# Patient Record
Sex: MALE | Race: WHITE | Employment: OTHER | URBAN - METROPOLITAN AREA
[De-identification: names, ages, dates, MRNs, and addresses within clinical notes are randomized per-mention and may not be internally consistent; named-entity substitution may affect disease eponyms.]

---

## 2017-01-01 ENCOUNTER — ANTI-COAG VISIT (OUTPATIENT)
Dept: PHARMACY | Age: 82
End: 2017-01-01

## 2017-01-01 ENCOUNTER — TELEPHONE (OUTPATIENT)
Dept: CARDIOLOGY CLINIC | Age: 82
End: 2017-01-01

## 2017-01-01 ENCOUNTER — OFFICE VISIT (OUTPATIENT)
Dept: CARDIOLOGY CLINIC | Age: 82
End: 2017-01-01

## 2017-01-01 ENCOUNTER — ANTI-COAG VISIT (OUTPATIENT)
Dept: CARDIOLOGY CLINIC | Age: 82
End: 2017-01-01

## 2017-01-01 ENCOUNTER — TELEPHONE (OUTPATIENT)
Dept: PHARMACY | Age: 82
End: 2017-01-01

## 2017-01-01 ENCOUNTER — HOSPITAL ENCOUNTER (OUTPATIENT)
Dept: OTHER | Age: 82
Discharge: HOME | End: 2017-03-31
Attending: INTERNAL MEDICINE | Admitting: INTERNAL MEDICINE

## 2017-01-01 VITALS
WEIGHT: 194 LBS | DIASTOLIC BLOOD PRESSURE: 78 MMHG | HEIGHT: 67 IN | BODY MASS INDEX: 30.45 KG/M2 | HEART RATE: 68 BPM | SYSTOLIC BLOOD PRESSURE: 140 MMHG | OXYGEN SATURATION: 94 %

## 2017-01-01 VITALS
SYSTOLIC BLOOD PRESSURE: 120 MMHG | HEIGHT: 67 IN | DIASTOLIC BLOOD PRESSURE: 70 MMHG | WEIGHT: 174 LBS | BODY MASS INDEX: 27.31 KG/M2 | HEART RATE: 64 BPM

## 2017-01-01 VITALS
WEIGHT: 185 LBS | SYSTOLIC BLOOD PRESSURE: 116 MMHG | DIASTOLIC BLOOD PRESSURE: 60 MMHG | HEIGHT: 67 IN | HEART RATE: 70 BPM | BODY MASS INDEX: 29.03 KG/M2

## 2017-01-01 VITALS
SYSTOLIC BLOOD PRESSURE: 132 MMHG | HEART RATE: 71 BPM | BODY MASS INDEX: 31.55 KG/M2 | DIASTOLIC BLOOD PRESSURE: 58 MMHG | WEIGHT: 201 LBS | HEIGHT: 67 IN

## 2017-01-01 DIAGNOSIS — I48.20 CHRONIC ATRIAL FIBRILLATION (HCC): ICD-10-CM

## 2017-01-01 DIAGNOSIS — I35.0 AORTIC STENOSIS, SEVERE: ICD-10-CM

## 2017-01-01 DIAGNOSIS — I48.20 CHRONIC ATRIAL FIBRILLATION (HCC): Primary | ICD-10-CM

## 2017-01-01 DIAGNOSIS — I10 ESSENTIAL HYPERTENSION, BENIGN: ICD-10-CM

## 2017-01-01 DIAGNOSIS — I34.0 NON-RHEUMATIC MITRAL REGURGITATION: ICD-10-CM

## 2017-01-01 DIAGNOSIS — I25.10 CORONARY ARTERY DISEASE INVOLVING NATIVE HEART WITHOUT ANGINA PECTORIS, UNSPECIFIED VESSEL OR LESION TYPE: Primary | ICD-10-CM

## 2017-01-01 DIAGNOSIS — I48.0 PAROXYSMAL ATRIAL FIBRILLATION (HCC): ICD-10-CM

## 2017-01-01 DIAGNOSIS — I25.10 CORONARY ARTERY DISEASE INVOLVING NATIVE HEART WITHOUT ANGINA PECTORIS, UNSPECIFIED VESSEL OR LESION TYPE: ICD-10-CM

## 2017-01-01 DIAGNOSIS — R06.02 SHORTNESS OF BREATH: ICD-10-CM

## 2017-01-01 LAB
INR BLD: 1.5
INR BLD: 1.5
INR BLD: 1.7
INR BLD: 1.7
INR BLD: 2
INR BLD: 2.3
INR BLD: 2.6
INR BLD: 2.7
INR BLD: 2.9
INR BLD: 3
INR BLD: 3.2
INR BLD: 3.7
INR BLD: 3.8
INR BLD: 3.9
PROTIME: 17.1 SECONDS
PROTIME: 24.5 SECONDS
PROTIME: 31.3 SECONDS
PROTIME: 32.5 SECONDS
PROTIME: 38.1 SECONDS
PROTIME: 44.4 SECONDS
PROTIME: 45.8 SECONDS

## 2017-01-01 PROCEDURE — G8598 ASA/ANTIPLAT THER USED: HCPCS | Performed by: NURSE PRACTITIONER

## 2017-01-01 PROCEDURE — 4040F PNEUMOC VAC/ADMIN/RCVD: CPT | Performed by: INTERNAL MEDICINE

## 2017-01-01 PROCEDURE — G8598 ASA/ANTIPLAT THER USED: HCPCS | Performed by: INTERNAL MEDICINE

## 2017-01-01 PROCEDURE — 1036F TOBACCO NON-USER: CPT | Performed by: NURSE PRACTITIONER

## 2017-01-01 PROCEDURE — G8417 CALC BMI ABV UP PARAM F/U: HCPCS | Performed by: INTERNAL MEDICINE

## 2017-01-01 PROCEDURE — 1123F ACP DISCUSS/DSCN MKR DOCD: CPT | Performed by: NURSE PRACTITIONER

## 2017-01-01 PROCEDURE — 1123F ACP DISCUSS/DSCN MKR DOCD: CPT | Performed by: INTERNAL MEDICINE

## 2017-01-01 PROCEDURE — G8427 DOCREV CUR MEDS BY ELIG CLIN: HCPCS | Performed by: INTERNAL MEDICINE

## 2017-01-01 PROCEDURE — 1036F TOBACCO NON-USER: CPT | Performed by: INTERNAL MEDICINE

## 2017-01-01 PROCEDURE — 4040F PNEUMOC VAC/ADMIN/RCVD: CPT | Performed by: NURSE PRACTITIONER

## 2017-01-01 PROCEDURE — 99214 OFFICE O/P EST MOD 30 MIN: CPT | Performed by: INTERNAL MEDICINE

## 2017-01-01 PROCEDURE — G8484 FLU IMMUNIZE NO ADMIN: HCPCS | Performed by: INTERNAL MEDICINE

## 2017-01-01 PROCEDURE — G8417 CALC BMI ABV UP PARAM F/U: HCPCS | Performed by: NURSE PRACTITIONER

## 2017-01-01 PROCEDURE — G8427 DOCREV CUR MEDS BY ELIG CLIN: HCPCS | Performed by: NURSE PRACTITIONER

## 2017-01-01 PROCEDURE — 99213 OFFICE O/P EST LOW 20 MIN: CPT | Performed by: NURSE PRACTITIONER

## 2017-01-01 RX ORDER — FUROSEMIDE 40 MG/1
40 TABLET ORAL DAILY
COMMUNITY

## 2017-01-01 RX ORDER — ATORVASTATIN CALCIUM 80 MG/1
80 TABLET, FILM COATED ORAL DAILY
COMMUNITY

## 2017-01-01 RX ORDER — SELENIUM 50 MCG
1 TABLET ORAL DAILY
COMMUNITY

## 2017-01-01 RX ORDER — CARVEDILOL 6.25 MG/1
6.25 TABLET ORAL 2 TIMES DAILY WITH MEALS
Qty: 180 TABLET | Refills: 3 | Status: SHIPPED | OUTPATIENT
Start: 2017-01-01

## 2017-01-01 RX ORDER — WARFARIN SODIUM 5 MG/1
TABLET ORAL
Qty: 30 TABLET | Refills: 11 | Status: SHIPPED | OUTPATIENT
Start: 2017-01-01

## 2017-01-01 RX ORDER — WARFARIN SODIUM 2.5 MG/1
2.5 TABLET ORAL
Qty: 30 TABLET | Refills: 11 | Status: SHIPPED | OUTPATIENT
Start: 2017-01-01

## 2017-01-01 RX ORDER — ISOSORBIDE MONONITRATE 30 MG/1
30 TABLET, EXTENDED RELEASE ORAL DAILY
COMMUNITY

## 2017-01-01 RX ORDER — FLUTICASONE PROPIONATE 50 MCG
1 SPRAY, SUSPENSION (ML) NASAL DAILY
COMMUNITY

## 2017-01-01 RX ORDER — OMEPRAZOLE 20 MG/1
20 CAPSULE, DELAYED RELEASE ORAL DAILY
COMMUNITY

## 2017-01-01 RX ORDER — BENZONATATE 100 MG/1
100 CAPSULE ORAL 3 TIMES DAILY PRN
COMMUNITY

## 2017-01-01 RX ORDER — ISOSORBIDE MONONITRATE 60 MG/1
60 TABLET, EXTENDED RELEASE ORAL DAILY
Qty: 30 TABLET | Refills: 11 | Status: SHIPPED | OUTPATIENT
Start: 2017-01-01

## 2017-01-01 RX ORDER — WARFARIN SODIUM 3 MG/1
3 TABLET ORAL DAILY
COMMUNITY

## 2017-01-03 ENCOUNTER — TELEPHONE (OUTPATIENT)
Dept: PHARMACY | Age: 82
End: 2017-01-03

## 2017-01-04 ENCOUNTER — ANTI-COAG VISIT (OUTPATIENT)
Dept: PHARMACY | Age: 82
End: 2017-01-04

## 2017-01-04 DIAGNOSIS — I48.20 CHRONIC ATRIAL FIBRILLATION (HCC): ICD-10-CM

## 2017-01-04 LAB
INR BLD: 2.5
PROTIME: 29.6 SECONDS

## 2017-10-03 NOTE — TELEPHONE ENCOUNTER
Spoke with Adelaide Clement [phone]   Informed her that since DR. Rama Sanchez wrote out the script for patient's PT/INR that she needs to call there office with results.

## 2017-10-03 NOTE — TELEPHONE ENCOUNTER
Coumadin/PT/INR    PT:30.1    INR:2.5    Where do you have your blood drawn?  (lab/HHRN/Home Monitor) HHRN    When was it drawn? Saturday 9/30/17    Why do you take Coumadin/Warfarin? Current medication dosage and instructions? 3.75 mg  M W F   And 2.5 all other days    Have you missed any doses? no    Any change in your other medications? no    Are you taking an antibiotic?no    Any dietary changes? no

## 2017-10-18 NOTE — TELEPHONE ENCOUNTER
Spoke with BODØ from [facility] coumadin clinic tried to explain to her that Patricia Yoo wrote out a prescription for patient's PT/INR and that he is the one who needs to be following patient's protime. BODØ stated to me that when the patient was discharged from the hospital he was sent home with home care,and  that the nurse would be checking his protime and since it was [facility] and not [facility] Moody Bravo has to dose patient's protime since he is the one who started the patient on warfarin . I informed her that are office suggests that the patient go to the coumadin clinic or be regulated by there pcp. Tried explaining to BODØ who kept wanting to argue with me about how DR. Ana Maria Villa is responsible to dose patient's warfarin  informed her  that on 9/6/17 patient's son had called in stating that he was looking alternative monoriting due to [facility] is to far for him to bring his dad, and wanted to get suggestions on what to do. BODØ kept insisting that Moody Bravo is the responsible one for the patient informed her that I would speak to Royal Becerra. Spoke with Royal Becerra explaining what was said between me and Jeremy Guillory stated to call Dr.Armitage mark to clarify if he would dose patient's PT/INR that what MAURISIO said about since Moody Bravo started this patient on warfarin that he has to dose him is not true. Called  office and waiting on a phone call back from there office to see if they are willing to monitor patient's PT/INR.

## 2017-10-19 NOTE — TELEPHONE ENCOUNTER
Coumadin/PT/INR    Pt: 34.4    INR: 2.9    Where do you have your blood drawn?  (lab/HHRN/Home Monitor) HHRN    When was it drawn? 10/19/17    Why do you take Coumadin/Warfarin? Current medication dosage and instructions? 2.5 mg everyday except  M W F those days  3.75    Have you missed any doses? No     Any change in your other medications? Pt has stopped taking isosorbide mononitrate (IMDUR) 60 MG extended release tablet   Pt felt it was causing him to have diarrhea. Pt has been educated not to stop taking it. Are you taking an antibiotic? no    Any dietary changes?no    Pt is also using Nitro daily and some days more than once a day. No details about what is causing pt to take this so often.

## 2017-10-19 NOTE — TELEPHONE ENCOUNTER
Called Santhosh Henriquez from [facility] lm to return our call need to discuss patient's protime.

## 2017-10-19 NOTE — TELEPHONE ENCOUNTER
Spoke with wife states that her  no longer uses [facility] that he now uses [facility] home care and that the nurse stephane his PT/INR today and should be calling us with the results.

## 2017-11-02 NOTE — TELEPHONE ENCOUNTER
Coumadin/PT/INR    PT:  36.1    INR:3.0    Where do you have your blood drawn?  (lab/HHRN/Home Monitor)    When was it drawn? Today     Why do you take Coumadin/Warfarin? Current medication dosage and instructions? 3.75mg m-w-f , 2.5mg all other days     Have you missed any doses? no    Any change in your other medications? no    Are you taking an antibiotic?no  Any dietary changes? no

## 2017-11-16 NOTE — PROGRESS NOTES
excessive thirst, fluid intake, or urination. No tremor. · Hematologic/Lymphatic: No abnormal bruising or bleeding, blood clots or swollen lymph nodes. · Allergic/Immunologic: No nasal congestion or hives. Physical Examination:    Vitals:    11/16/17 0930   BP: 116/60   Pulse: 70        Constitutional and General Appearance: NAD, appears stated age  Skin:good turgor,intact without lesions  HEENT: EOMI ,normal  Neck:no JVD    Respiratory:  · Normal excursion and expansion without use of accessory muscles  · Resp Auscultation: Normal breath sounds without dullness  Cardiovascular:  · The apical impulses not displaced  · Heart tones are crisp and normal  · Cervical veins are not engorged  · The carotid upstroke is normal in amplitude and contour without delay or bruit  · 3/6 BANDAR; irregularly irregular rhythm  · Peripheral pulses are symmetrical and full  · There is no clubbing, cyanosis of the extremities. · 1+ edema L foot, Trace R foot  · Femoral Arteries: 2+ and equal  · Pedal Pulses: 2+ and equal   Abdomen:  · No masses or tenderness  · Liver/Spleen: No Abnormalities Noted  Neurological/Psychiatric:  · Alert and oriented in all spheres  · Moves all extremities well  · Walks with cane for stability  · No abnormalities of mood, affect, memory, mentation, or behavior are noted    Echo 5/9/16  Normal left ventricle size and systolic function with an estimated ejection   fraction of 60%.    -No regional wall motion abnormalities are seen.   -Mild concentric left ventricular hypertrophy is present.   -Mitral annular calcification is present.   -Calcification of the mitral valve noted, that result in mild stenosis.   -Mitral regurgitation is present and is difficult to quantitate due to   shadowing from the calcified annulus, but may be moderate.   -The aortic valve is severly calcified with decreased leaflet mobility   consistent with severe aortic stenosis   -Mild-to-moderate aortic regurgitation is

## 2017-11-16 NOTE — LETTER
[address]  [address] [address] [place] [address]  Phone: [phone]  Fax: [phone]    Lana Campos MD        November 16, 2017     Zhao Hinojosa  [id number]  [facility] [address]    Patient: Ksenia Dhaliwal  MR Number: [id number]  YOB: 1927  Date of Visit: 11/16/2017    Dear Dr. Zhao Hinojosa:    Thank you for the request for consultation for Rafa Dickinson to me for the evaluation of cad. Below are the relevant portions of my assessment and plan of care. [facility]   Cardiac Follow up    Referring Provider:  Zhao Hinojosa     Chief Complaint   Patient presents with    Atrial Fibrillation    Hypertension    Chest Pain     Pt is still having constant chest pain. History of Present Illness:  Mr. Michael Russell is an 80 y. o.gentleman here today for a four month follow up. He is here with his son. In January 2014, he had an angiogram for exertional arm and chest pain. He was found to have a 50% lesion of the RCA and LAD, with normal EF. He has a history of atrial fibrillation, hypertension, and  aortic stenosis. Other history includes sciatica that has been treated with morphine (old football injury from 65); he also has chronic hip pain and states he is \"too old\" for replacement. He has had bilateral knee replacement in the past.  He underwent angiogram 12/2015 for work up for potential TAVR. He was found to have moderate-severe MR as well, and significant, multivessel CAD   with marked progression of CAD . It was decided he is not a surgical candidate. He was started on coreg, isosorbide, and warfarin. Echo 5/2016 shows eF 60%. He is now on ranexa    He can walk 30 feet, but then has to stop to \"catch his breath\" He continues to use nitro frequently, with relief of chest pain. He has not increased the use or frequency of the nitro.   He has no palpitations or · Gastrointestinal: No abdominal pain, appetite loss, blood in stools. No change in bowel or bladder habits. · Genitourinary: No dysuria, trouble voiding, or hematuria. · Musculoskeletal:  Walks with a cane. Periodic sciatica as well as hip pain. Back and knee pain  · Integumentary: No rash or pruritis. · Neurological: No headache, diplopia, change in muscle strength, numbness or tingling. No change in gait, balance, coordination, mood, affect, memory, mentation, behavior. · Psychiatric: No anxiety, no depression. · Endocrine: No malaise, fatigue or temperature intolerance. No excessive thirst, fluid intake, or urination. No tremor. · Hematologic/Lymphatic: No abnormal bruising or bleeding, blood clots or swollen lymph nodes. · Allergic/Immunologic: No nasal congestion or hives. Physical Examination:    Vitals:    11/16/17 0930   BP: 116/60   Pulse: 70        Constitutional and General Appearance: NAD, appears younger than stated age  Respiratory:  · Normal excursion and expansion without use of accessory muscles  · Resp Auscultation: Normal breath sounds without dullness  Cardiovascular:  · The apical impulses not displaced  · Heart tones are crisp and normal  · Cervical veins are not engorged  · The carotid upstroke is normal in amplitude and contour without delay or bruit  · 3/6 BANDAR; irregularly irregular rhythm  · Peripheral pulses are symmetrical and full  · There is no clubbing, cyanosis of the extremities. · 1+ edema L foot, Trace R foot  · Femoral Arteries: 2+ and equal  · Pedal Pulses: 2+ and equal   Abdomen:  · No masses or tenderness  · Liver/Spleen: No Abnormalities Noted  Neurological/Psychiatric:  · Alert and oriented in all spheres  · Moves all extremities well  · Walks with cane for stability  · No abnormalities of mood, affect, memory, mentation, or behavior are noted    Echo 5/9/16  Normal left ventricle size and systolic function with an estimated ejection   fraction of 60%.  -No regional wall motion abnormalities are seen.   -Mild concentric left ventricular hypertrophy is present.   -Mitral annular calcification is present.   -Calcification of the mitral valve noted, that result in mild stenosis.   -Mitral regurgitation is present and is difficult to quantitate due to   shadowing from the calcified annulus, but may be moderate.   -The aortic valve is severly calcified with decreased leaflet mobility   consistent with severe aortic stenosis   -Mild-to-moderate aortic regurgitation is present.   -There is mild tricuspid regurgitation with RVSP estimated at 32 mmHg.   -Dilated left atrium with a volume of 83 ml. Assessment/Plan:  1. CAD:  Class 3 angina   -- 12/2015 cath: Cath with severe aortic stenosis, likely severe mitral regurg, progressive 3 vessel CAD with 80% prox LAD wth sluggish flow perhaps related to LVH, 90% OM1, 40% prox RCA, 85%              distal RCA. EF 60% --stable at this time, will continue           2. Atrial fibrillation:      Rate well controlled. Remains on Coumadin (not interested in trying the newer agents). Managed by home nurse    3. Essential hypertension---- /60 (Site: Right Arm, Position: Sitting, Cuff Size: Large Adult)   Pulse 70   Ht 5' 7\" (1.702 m)   Wt 185 lb (83.9 kg)   BMI 28.98 kg/m²        4. Aortic stenosis-not surgical/TAVR candidate--continue to treat medically      -- echo showed EF 60% severe AS, and mod-severe MR      --CASPER 12/21/15: Overall left ventricular function is normal.. No regional wall motion abnormalities are seen. LVH is noted      Marked calcification of mitral annulus,partly flail component of anterior mitral valve. Severe mitral regurgitation with 2 large jets noted. The aortic valve is thickened/calcified with decreased leaflet mobility consistent with aortic           stenosis. 5. Severe MR-- not a surgical candidate.   6. Hyperlipidemia--12/2015 , HDL 26, LDL 93,         Plan: Recommend tylenol pm to decrease nasal congestion, morning cough. 1.   Can take tylenol pm at night -as directed on package  2. Small frequent meals  3. Call for any changes  4. Follow up in 6 months        I appreciate the opportunity of cooperating in the care of this individual.    Mary Carbajal. Cody Anders M.D., [facility] [place]                 If you have questions, please do not hesitate to call me. I look forward to following Aysha Potts along with you.     Sincerely,        Nerissa Zaragoza MD

## 2017-11-16 NOTE — COMMUNICATION BODY
[facility]   Cardiac Follow up    Referring Provider:  Tino Night     Chief Complaint   Patient presents with    Atrial Fibrillation    Hypertension    Chest Pain     Pt is still having constant chest pain. History of Present Illness:  Mr. Ann Davis is an 80 y. o.gentleman here today for a four month follow up. He is here with his son. In January 2014, he had an angiogram for exertional arm and chest pain. He was found to have a 50% lesion of the RCA and LAD, with normal EF. He has a history of atrial fibrillation, hypertension, and  aortic stenosis. Other history includes sciatica that has been treated with morphine (old football injury from 65); he also has chronic hip pain and states he is \"too old\" for replacement. He has had bilateral knee replacement in the past.  He underwent angiogram 12/2015 for work up for potential TAVR. He was found to have moderate-severe MR as well, and significant, multivessel CAD   with marked progression of CAD . It was decided he is not a surgical candidate. He was started on coreg, isosorbide, and warfarin. Echo 5/2016 shows eF 60%. He is now on ranexa    He can walk 30 feet, but then has to stop to \"catch his breath\" He continues to use nitro frequently, with relief of chest pain. He has not increased the use or frequency of the nitro. He has no palpitations or dizziness. Presents in a wheelchair. He states he usually awakens with clear phlegm. Past Medical History:  past medical history of Atrial fibrillation; Murmur; Hypertension    Surgical History:   has a past surgical history that includes joint replacement; joint replacement; other surgical history (2002); eye surgery; Bladder surgery; and Colonoscopy (3/2015). Social History:   reports that he has never smoked. He has never used smokeless tobacco. He reports that he does not drink alcohol or use drugs.      Family History:  family history includes Heart Disease in his brother and sister. Current Outpatient Prescriptions   Medication Sig Dispense Refill    isosorbide mononitrate (IMDUR) 60 MG extended release tablet Take 1 tablet by mouth daily 30 tablet 11    carvedilol (COREG) 6.25 MG tablet Take 1 tablet by mouth 2 times daily (with meals) 180 tablet 3    nitroGLYCERIN (NITROSTAT) 0.4 MG SL tablet Place 1 tablet under the tongue every 5 minutes as needed for Chest pain 50 tablet 11    aspirin 81 MG chewable tablet Take 1 tablet by mouth daily 30 tablet 3    warfarin (COUMADIN) 5 MG tablet Take 5 mg by mouth twice a week. On Monday & Friday      Naproxen Sodium (ALEVE PO) Take  by mouth as needed.  warfarin (COUMADIN) 2.5 MG tablet Take 2.5 mg by mouth Five times weekly. 2.5mg on Sun, Tue, Wed, Thu and Sat. No current facility-administered medications for this visit. Allergies:  Review of patient's allergies indicates no known allergies. Review of Systems:   · Constitutional: there has been no unanticipated weight loss. There's been no change in energy level, sleep pattern, or activity level. · Eyes: No visual changes or diplopia. No scleral icterus. · ENT: No Headaches, hearing loss or vertigo. No mouth sores or sore throat. · Cardiovascular: in HPI  · Respiratory: No cough or wheezing, no sputum production. No hematemesis. +Exertional SOB  · Gastrointestinal: No abdominal pain, appetite loss, blood in stools. No change in bowel or bladder habits. · Genitourinary: No dysuria, trouble voiding, or hematuria. · Musculoskeletal:  Walks with a cane. Periodic sciatica as well as hip pain. Back and knee pain  · Integumentary: No rash or pruritis. · Neurological: No headache, diplopia, change in muscle strength, numbness or tingling. No change in gait, balance, coordination, mood, affect, memory, mentation, behavior. · Psychiatric: No anxiety, no depression. · Endocrine: No malaise, fatigue or temperature intolerance.  No excessive thirst, fluid intake, or urination. No tremor. · Hematologic/Lymphatic: No abnormal bruising or bleeding, blood clots or swollen lymph nodes. · Allergic/Immunologic: No nasal congestion or hives. Physical Examination:    Vitals:    11/16/17 0930   BP: 116/60   Pulse: 70        Constitutional and General Appearance: NAD, appears younger than stated age  Respiratory:  · Normal excursion and expansion without use of accessory muscles  · Resp Auscultation: Normal breath sounds without dullness  Cardiovascular:  · The apical impulses not displaced  · Heart tones are crisp and normal  · Cervical veins are not engorged  · The carotid upstroke is normal in amplitude and contour without delay or bruit  · 3/6 BANDAR; irregularly irregular rhythm  · Peripheral pulses are symmetrical and full  · There is no clubbing, cyanosis of the extremities. · 1+ edema L foot, Trace R foot  · Femoral Arteries: 2+ and equal  · Pedal Pulses: 2+ and equal   Abdomen:  · No masses or tenderness  · Liver/Spleen: No Abnormalities Noted  Neurological/Psychiatric:  · Alert and oriented in all spheres  · Moves all extremities well  · Walks with cane for stability  · No abnormalities of mood, affect, memory, mentation, or behavior are noted    Echo 5/9/16  Normal left ventricle size and systolic function with an estimated ejection   fraction of 60%.    -No regional wall motion abnormalities are seen.   -Mild concentric left ventricular hypertrophy is present.   -Mitral annular calcification is present.   -Calcification of the mitral valve noted, that result in mild stenosis.   -Mitral regurgitation is present and is difficult to quantitate due to   shadowing from the calcified annulus, but may be moderate.   -The aortic valve is severly calcified with decreased leaflet mobility   consistent with severe aortic stenosis   -Mild-to-moderate aortic regurgitation is present.   -There is mild tricuspid regurgitation with RVSP estimated at 32 mmHg.   -Dilated left atrium with a volume of 83 ml. Assessment/Plan:  1. CAD:  Class 3 angina   -- 12/2015 cath: Cath with severe aortic stenosis, likely severe mitral regurg, progressive 3 vessel CAD with 80% prox LAD wth sluggish flow perhaps related to LVH, 90% OM1, 40% prox RCA, 85%              distal RCA. EF 60% --stable at this time, will continue           2. Atrial fibrillation:      Rate well controlled. Remains on Coumadin (not interested in trying the newer agents). Managed by home nurse    3. Essential hypertension---- /60 (Site: Right Arm, Position: Sitting, Cuff Size: Large Adult)   Pulse 70   Ht 5' 7\" (1.702 m)   Wt 185 lb (83.9 kg)   BMI 28.98 kg/m²        4. Aortic stenosis-not surgical/TAVR candidate--continue to treat medically      -- echo showed EF 60% severe AS, and mod-severe MR      --CASPER 12/21/15: Overall left ventricular function is normal.. No regional wall motion abnormalities are seen. LVH is noted      Marked calcification of mitral annulus,partly flail component of anterior mitral valve. Severe mitral regurgitation with 2 large jets noted. The aortic valve is thickened/calcified with decreased leaflet mobility consistent with aortic           stenosis. 5. Severe MR-- not a surgical candidate. 6. Hyperlipidemia--12/2015 , HDL 26, LDL 93,         Plan:  Recommend tylenol pm to decrease nasal congestion, morning cough. 1.   Can take tylenol pm at night -as directed on package  2. Small frequent meals  3. Call for any changes  4. Follow up in 6 months        I appreciate the opportunity of cooperating in the care of this individual.    Kari Sanam.  Steen Oppenheim, M.D., [facility] [place]

## 2017-12-05 NOTE — TELEPHONE ENCOUNTER
Coumadin/PT/INR    PT: 46.7    INR: 3.9    Where do you have your blood drawn? Home  (lab/HHRN/Home Monitor)    When was it drawn? 12/5/17    Why do you take Coumadin/Warfarin? Current medication dosage and instructions? 3.75 mg Friday and 2.5 all other days     Have you missed any doses? No    Any change in your other medications? No    Are you taking an antibiotic? No    Any dietary changes?  No

## 2017-12-12 NOTE — TELEPHONE ENCOUNTER
Son of pt called to adv spk with les and les would like for family to come in to discuss options in plan of care. There is a [facility] hold for 12/19/17 2 pm can I use this for the appt. Please let me know and I will call the family.     Thank you CSF

## 2017-12-20 NOTE — LETTER
[address] [place] [address] Cardiology [place] [address] [address] [address]  Phone: [phone]  Fax: [phone]    Jania Adame MD        December 26, 2017     Tino Chavis  [id number]  [facility] [id number]    Patient: Karri Hilario  MR Number: [id number]  YOB: 1927  Date of Visit: 12/20/2017    Dear Dr. Tino Chavis:    [address]   Cardiac Follow up    Referring Provider:  Tino Chavis     Chief Complaint   Patient presents with    Follow-up    Atrial Fibrillation    Hypertension    Coronary Artery Disease        History of Present Illness:  Mr. Ann Davis is an 80 y. o.gentleman here today in follow up. He is here with his son, daughter, and wife. In January 2014, he had an angiogram for exertional arm and chest pain. He was found to have a 50% lesion of the RCA and LAD, with normal EF. He has a history of atrial fibrillation, hypertension, and  aortic stenosis. Other history includes sciatica that has been treated with morphine (old football injury from 65); he also has chronic hip pain and states he is \"too old\" for replacement. He has had bilateral knee replacement in the past.  He underwent angiogram 12/2015 for work up for potential TAVR. He was found to have moderate-severe MR as well, and significant, multivessel CAD   with marked progression of CAD . It was decided he is not a surgical candidate,not a candidate for TAVR. He was started on coreg, isosorbide, and warfarin. Echo 5/2016 shows eF 60%. He is now on ranexa    He was recently hospitalized at [facility] for CHF. Echo was done and EF 60-65%. He currently resides at [facility] for physical therapy. He is asking that he be allowed to keep his nitroglycerin at his bedside at the assisted living facility. He has had gradual progression of his shortness of breath. At times he has associated pounding of his heart. tablet by mouth daily 30 tablet 11    Naproxen Sodium (ALEVE PO) Take  by mouth as needed. No current facility-administered medications for this visit. Allergies:  Review of patient's allergies indicates no known allergies. Review of Systems:   · Constitutional: there has been no unanticipated weight loss. There's been no change in energy level, sleep pattern, or activity level. · Eyes: No visual changes or diplopia. No scleral icterus. · ENT: No Headaches, hearing loss or vertigo. No mouth sores or sore throat. · Cardiovascular: in HPI  · Respiratory: No cough or wheezing, no sputum production. No hematemesis. +Exertional SOB  · Gastrointestinal: No abdominal pain, appetite loss, blood in stools. No change in bowel or bladder habits. · Genitourinary: No dysuria, trouble voiding, or hematuria. · Musculoskeletal:  Walks with a cane. Periodic sciatica as well as hip pain. Back and knee pain  · Integumentary: No rash or pruritis. · Neurological: No headache, diplopia, change in muscle strength, numbness or tingling. No change in gait, balance, coordination, mood, affect, memory, mentation, behavior. · Psychiatric: No anxiety, no depression. · Endocrine: No malaise, fatigue or temperature intolerance. No excessive thirst, fluid intake, or urination. No tremor. · Hematologic/Lymphatic: No abnormal bruising or bleeding, blood clots or swollen lymph nodes. · Allergic/Immunologic: No nasal congestion or hives.     Physical Examination:    Vitals:    12/20/17 0858   BP: 120/70   Pulse: 64        Constitutional and General Appearance: NAD, appears stated age  Skin:good turgor,intact without lesions  HEENT: EOMI ,normal  Neck:no JVD    Respiratory:  · Normal excursion and expansion without use of accessory muscles  · Resp Auscultation: Normal breath sounds without dullness  Cardiovascular:  · The apical impulses not displaced  · Heart tones are crisp and normal · Cervical veins are not engorged  · The carotid upstroke is normal in amplitude and contour without delay or bruit  · 3/6 BANDAR; irregularly irregular rhythm  · Peripheral pulses are symmetrical and full  · There is no clubbing, cyanosis of the extremities. · Trace BLE  · Femoral Arteries: 2+ and equal  · Pedal Pulses: 2+ and equal   Abdomen:  · No masses or tenderness  · Liver/Spleen: No Abnormalities Noted  Neurological/Psychiatric:  · Alert and oriented in all spheres  · Moves all extremities well  · Walks with cane for stability  · No abnormalities of mood, affect, memory, mentation, or behavior are noted    [facility]  Summary:  Atrial fibrillation with a ventricular rate of 72 beats per minute. Overall left ventricular ejection fraction is estimated to be 60-65%. There is mild concentric left ventricular hypertrophy. The left ventricular wall motion is normal.  There is moderate right ventricular hypertrophy. The left atrium is severely dilated. The right atrium is moderately dilated. The mitral valve leaflets are severely thickened and calcified in  appearance. There is moderate to severe mitral regurgitation. The Aortic Valve is severely calcified. Aortic Valve leaflets appear thickened. Mild to moderate aortic regurgitation. Severe valvular aortic stenosis. The tricuspid valve leaflets are thickened in appearance. Moderate  - severe tricuspid regurgitation is present. The estimated right ventricular systolic pressure is consistent with  severe pulmonary hypertension. Echo 5/9/16  Normal left ventricle size and systolic function with an estimated ejection   fraction of 60%.    -No regional wall motion abnormalities are seen.   -Mild concentric left ventricular hypertrophy is present.   -Mitral annular calcification is present.   -Calcification of the mitral valve noted, that result in mild stenosis.   -Mitral regurgitation is present and is difficult to quantitate due to  shadowing from the calcified annulus, but may be moderate.   -The aortic valve is severly calcified with decreased leaflet mobility   consistent with severe aortic stenosis   -Mild-to-moderate aortic regurgitation is present.   -There is mild tricuspid regurgitation with RVSP estimated at 32 mmHg.   -Dilated left atrium with a volume of 83 ml. Assessment/Plan:  1. CAD:  Class 3 angina   -- 12/2015 cath: Cath with severe aortic stenosis, likely severe mitral regurg, progressive 3 vessel CAD with 80% prox LAD wth sluggish flow perhaps related to LVH, 90% OM1, 40% prox RCA, 85%              distal RCA. EF 60%. Echo 12/2017 EF 60-65%         2. Atrial fibrillation:      Rate well controlled. Remains on Coumadin (not interested in trying the newer agents). 3. Essential hypertension---- /70 (Site: Left Arm, Position: Sitting, Cuff Size: Medium Adult)   Pulse 64   Ht 5' 7\" (1.702 m)   Wt 174 lb (78.9 kg)   BMI 27.25 kg/m²  stable    4. Aortic stenosis-not surgical/TAVR candidate--continue to treat medically      -- echo showed EF 60% severe AS, and mod-severe MR      --CASPER 12/21/15: Overall left ventricular function is normal.. No regional wall motion abnormalities are seen. LVH is noted      Marked calcification of mitral annulus,partly flail component of anterior mitral valve. Severe mitral regurgitation with 2 large jets noted. The aortic valve is thickened/calcified with decreased leaflet mobility consistent with aortic           Stenosis. Echo at [address] 12/7/17>The Aortic Valve is severely calcified. Aortic Valve leaflets appear thickened. Mild to moderate aortic regurgitation. Severe valvular aortic stenosis. 5. Severe MR-- not a surgical candidate. 6. Hyperlipidemia--12/2015 , HDL 26, LDL 93, . Do not have recent lab work available in chart. Lipitor increased to 80 mg daily according to family.         Plan:  If weight gain of 5 lbs may take an extra Lasix 40 mg around 4 pm

## 2017-12-20 NOTE — PROGRESS NOTES
[address]   Cardiac Follow up    Referring Provider:  Leandra Jeronimo     Chief Complaint   Patient presents with    Follow-up    Atrial Fibrillation    Hypertension    Coronary Artery Disease        History of Present Illness:  Mr. Helen Loya is an 80 y. o.gentleman here today in follow up. He is here with his son, daughter, and wife. In January 2014, he had an angiogram for exertional arm and chest pain. He was found to have a 50% lesion of the RCA and LAD, with normal EF. He has a history of atrial fibrillation, hypertension, and  aortic stenosis. Other history includes sciatica that has been treated with morphine (old football injury from 65); he also has chronic hip pain and states he is \"too old\" for replacement. He has had bilateral knee replacement in the past.  He underwent angiogram 12/2015 for work up for potential TAVR. He was found to have moderate-severe MR as well, and significant, multivessel CAD   with marked progression of CAD . It was decided he is not a surgical candidate,not a candidate for TAVR. He was started on coreg, isosorbide, and warfarin. Echo 5/2016 shows eF 60%. He is now on ranexa    He was recently hospitalized at [facility] for CHF. Echo was done and EF 60-65%. He currently resides at [facility] for physical therapy. He is asking that he be allowed to keep his nitroglycerin at his bedside at the assisted living facility. He has had gradual progression of his shortness of breath. At times he has associated pounding of his heart. Trace edema. He denies chest pain or dizziness. Past Medical History:  past medical history of Atrial fibrillation; Murmur; Hypertension    Surgical History:   has a past surgical history that includes joint replacement; joint replacement; other surgical history (2002); eye surgery; Bladder surgery; and Colonoscopy (3/2015). Social History:   reports that he has never smoked.  He has never used smokeless tobacco. He reports that he does not drink alcohol or use drugs. Family History:  family history includes Heart Disease in his brother and sister. Current Outpatient Prescriptions   Medication Sig Dispense Refill    atorvastatin (LIPITOR) 80 MG tablet Take 80 mg by mouth daily      furosemide (LASIX) 40 MG tablet Take 40 mg by mouth daily      diclofenac sodium 1 % GEL Apply 2 g topically 4 times daily as needed for Pain      isosorbide mononitrate (IMDUR) 30 MG extended release tablet Take 30 mg by mouth daily      omeprazole (PRILOSEC) 20 MG delayed release capsule Take 20 mg by mouth daily      Lactobacillus (ACIDOPHILUS) CAPS capsule Take 1 capsule by mouth daily      fluticasone (FLONASE ALLERGY RELIEF) 50 MCG/ACT nasal spray 1 spray by Nasal route daily      benzonatate (TESSALON) 100 MG capsule Take 100 mg by mouth 3 times daily as needed for Cough      warfarin (COUMADIN) 3 MG tablet Take 3 mg by mouth daily      carvedilol (COREG) 6.25 MG tablet Take 1 tablet by mouth 2 times daily (with meals) 180 tablet 3    nitroGLYCERIN (NITROSTAT) 0.4 MG SL tablet Place 1 tablet under the tongue every 5 minutes as needed for Chest pain 50 tablet 11    aspirin 81 MG chewable tablet Take 1 tablet by mouth daily 30 tablet 3    warfarin (COUMADIN) 5 MG tablet As directed 30 tablet 11    warfarin (COUMADIN) 2.5 MG tablet Take 1 tablet by mouth Five times weekly 2.5mg on Sun, Tue, Wed, Thu and Sat. 30 tablet 11    isosorbide mononitrate (IMDUR) 60 MG extended release tablet Take 1 tablet by mouth daily 30 tablet 11    Naproxen Sodium (ALEVE PO) Take  by mouth as needed. No current facility-administered medications for this visit. Allergies:  Review of patient's allergies indicates no known allergies. Review of Systems:   · Constitutional: there has been no unanticipated weight loss. There's been no change in energy level, sleep pattern, or activity level.      · Eyes: No visual oriented in all spheres  · Moves all extremities well  · Walks with cane for stability  · No abnormalities of mood, affect, memory, mentation, or behavior are noted    Echo at [facility] 12/7/17  Summary:  Atrial fibrillation with a ventricular rate of 72 beats per minute. Overall left ventricular ejection fraction is estimated to be 60-65%. There is mild concentric left ventricular hypertrophy. The left ventricular wall motion is normal.  There is moderate right ventricular hypertrophy. The left atrium is severely dilated. The right atrium is moderately dilated. The mitral valve leaflets are severely thickened and calcified in  appearance. There is moderate to severe mitral regurgitation. The Aortic Valve is severely calcified. Aortic Valve leaflets appear thickened. Mild to moderate aortic regurgitation. Severe valvular aortic stenosis. The tricuspid valve leaflets are thickened in appearance. Moderate  - severe tricuspid regurgitation is present. The estimated right ventricular systolic pressure is consistent with  severe pulmonary hypertension. Echo 5/9/16  Normal left ventricle size and systolic function with an estimated ejection   fraction of 60%.  -No regional wall motion abnormalities are seen.   -Mild concentric left ventricular hypertrophy is present.   -Mitral annular calcification is present.   -Calcification of the mitral valve noted, that result in mild stenosis.   -Mitral regurgitation is present and is difficult to quantitate due to   shadowing from the calcified annulus, but may be moderate.   -The aortic valve is severly calcified with decreased leaflet mobility   consistent with severe aortic stenosis   -Mild-to-moderate aortic regurgitation is present.   -There is mild tricuspid regurgitation with RVSP estimated at 32 mmHg.   -Dilated left atrium with a volume of 83 ml. Assessment/Plan:  1.  CAD:  Class 3 angina   -- 12/2015 cath: Cath with severe aortic stenosis, likely severe mitral regurg, progressive 3 vessel CAD with 80% prox LAD wth sluggish flow perhaps related to LVH, 90% OM1, 40% prox RCA, 85%              distal RCA. EF 60%. Echo 12/2017 EF 60-65%         2. Atrial fibrillation:      Rate well controlled. Remains on Coumadin (not interested in trying the newer agents). 3. Essential hypertension---- /70 (Site: Left Arm, Position: Sitting, Cuff Size: Medium Adult)   Pulse 64   Ht 5' 7\" (1.702 m)   Wt 174 lb (78.9 kg)   BMI 27.25 kg/m² stable    4. Aortic stenosis-not surgical/TAVR candidate--continue to treat medically      -- echo showed EF 60% severe AS, and mod-severe MR      --CASPER 12/21/15: Overall left ventricular function is normal.. No regional wall motion abnormalities are seen. LVH is noted      Marked calcification of mitral annulus,partly flail component of anterior mitral valve. Severe mitral regurgitation with 2 large jets noted. The aortic valve is thickened/calcified with decreased leaflet mobility consistent with aortic           Stenosis. Echo at [address] 12/7/17>The Aortic Valve is severely calcified. Aortic Valve leaflets appear thickened. Mild to moderate aortic regurgitation. Severe valvular aortic stenosis. 5. Severe MR-- not a surgical candidate. 6. Hyperlipidemia--12/2015 , HDL 26, LDL 93, . Do not have recent lab work available in chart. Lipitor increased to 80 mg daily according to family. Plan:  If weight gain of 5 lbs may take an extra Lasix 40 mg around 4 pm  No other medication changes  Follow up in 3 months  He should be allowed to keep nitro at bedsisde          I appreciate the opportunity of cooperating in the care of this individual.    Jodie Valle M.D., [facility]

## 2017-12-26 NOTE — COMMUNICATION BODY
changes or diplopia. No scleral icterus. · ENT: No Headaches, hearing loss or vertigo. No mouth sores or sore throat. · Cardiovascular: in HPI  · Respiratory: No cough or wheezing, no sputum production. No hematemesis. +Exertional SOB  · Gastrointestinal: No abdominal pain, appetite loss, blood in stools. No change in bowel or bladder habits. · Genitourinary: No dysuria, trouble voiding, or hematuria. · Musculoskeletal:  Walks with a cane. Periodic sciatica as well as hip pain. Back and knee pain  · Integumentary: No rash or pruritis. · Neurological: No headache, diplopia, change in muscle strength, numbness or tingling. No change in gait, balance, coordination, mood, affect, memory, mentation, behavior. · Psychiatric: No anxiety, no depression. · Endocrine: No malaise, fatigue or temperature intolerance. No excessive thirst, fluid intake, or urination. No tremor. · Hematologic/Lymphatic: No abnormal bruising or bleeding, blood clots or swollen lymph nodes. · Allergic/Immunologic: No nasal congestion or hives. Physical Examination:    Vitals:    12/20/17 0858   BP: 120/70   Pulse: 64        Constitutional and General Appearance: NAD, appears stated age  Skin:good turgor,intact without lesions  HEENT: EOMI ,normal  Neck:no JVD    Respiratory:  · Normal excursion and expansion without use of accessory muscles  · Resp Auscultation: Normal breath sounds without dullness  Cardiovascular:  · The apical impulses not displaced  · Heart tones are crisp and normal  · Cervical veins are not engorged  · The carotid upstroke is normal in amplitude and contour without delay or bruit  · 3/6 BANDAR; irregularly irregular rhythm  · Peripheral pulses are symmetrical and full  · There is no clubbing, cyanosis of the extremities.   · Trace BLE  · Femoral Arteries: 2+ and equal  · Pedal Pulses: 2+ and equal   Abdomen:  · No masses or tenderness  · Liver/Spleen: No Abnormalities Noted  Neurological/Psychiatric:  · Alert and severe mitral regurg, progressive 3 vessel CAD with 80% prox LAD wth sluggish flow perhaps related to LVH, 90% OM1, 40% prox RCA, 85%              distal RCA. EF 60%. Echo 12/2017 EF 60-65%         2. Atrial fibrillation:      Rate well controlled. Remains on Coumadin (not interested in trying the newer agents). 3. Essential hypertension---- /70 (Site: Left Arm, Position: Sitting, Cuff Size: Medium Adult)   Pulse 64   Ht 5' 7\" (1.702 m)   Wt 174 lb (78.9 kg)   BMI 27.25 kg/m²  stable    4. Aortic stenosis-not surgical/TAVR candidate--continue to treat medically      -- echo showed EF 60% severe AS, and mod-severe MR      --CASPER 12/21/15: Overall left ventricular function is normal.. No regional wall motion abnormalities are seen. LVH is noted      Marked calcification of mitral annulus,partly flail component of anterior mitral valve. Severe mitral regurgitation with 2 large jets noted. The aortic valve is thickened/calcified with decreased leaflet mobility consistent with aortic           Stenosis. Echo at [address] 12/7/17>The Aortic Valve is severely calcified. Aortic Valve leaflets appear thickened. Mild to moderate aortic regurgitation. Severe valvular aortic stenosis. 5. Severe MR-- not a surgical candidate. 6. Hyperlipidemia--12/2015 , HDL 26, LDL 93, . Do not have recent lab work available in chart. Lipitor increased to 80 mg daily according to family. Plan:  If weight gain of 5 lbs may take an extra Lasix 40 mg around 4 pm  No other medication changes  Follow up in 3 months  He should be allowed to keep nitro at bedsisd          I appreciate the opportunity of cooperating in the care of this individual.    Ru Villa M.D., [facility] [place]

## 2018-01-01 ENCOUNTER — TELEPHONE (OUTPATIENT)
Dept: CARDIOLOGY CLINIC | Age: 83
End: 2018-01-01

## 2018-01-02 NOTE — TELEPHONE ENCOUNTER
Yudelka calling Pt has 3+ pitting edema in legs and ankles, pt weight 01/01/18 174.6 lbs, today 01/02/18 pt is at 177 lbs. Need to know if they need to increase pt medications or what they need to do?  Pls call to advise Thank you

## 2018-01-29 ENCOUNTER — TELEPHONE (OUTPATIENT)
Dept: CARDIOLOGY CLINIC | Age: 83
End: 2018-01-29

## 2020-01-27 NOTE — TELEPHONE ENCOUNTER
Son of pt called to adv pt is in [facility]. Pt is having difficulty breathing. Please call to discuss possible plan of care.     Thank you [facility] Referring Physician (Optional): Caitlin SOUSA

## 2023-03-16 NOTE — TELEPHONE ENCOUNTER
Pt leaving nursing home 1/19/18 with hospice home care. At the families request they would like for LES to be the following physician. Please call to adv if willing to do so and to discuss the admissions orders.     Thank you [facility] Hydroxyzine Pregnancy And Lactation Text: This medication is not safe during pregnancy and should not be taken. It is also excreted in breast milk and breast feeding isn't recommended.